# Patient Record
(demographics unavailable — no encounter records)

---

## 2017-12-07 NOTE — NUR
12/07/17 0439 Ivette Eldridge
0424 - PT ARRIVED TO PACU. MAINTAINING OWN AIRWAY. BP TAKEN WITH
MANUAL BP CUFF FOR ALL PRESSURES R/T INACURATE READINGS WITH MONITOR.

## 2017-12-08 NOTE — PR
[facility]
                                    [address]
                                  [place], [place]  [address]
_________________________________________________________________________________________
                                                                 Signed   
 
 
===================================
PP Progress Notes
===================================
Datetime Report Generated by CPN: 2017 10:09
   
SUBJECTIVE:  [id number]
Pain:  Within normal limits
Pain Comments:  has scratchy throat
Nausea/Vomiting:  Denies
Vital Signs:  [id number]
Vital Signs:  Reviewed; Within Normal Limits
POSTPARTUM EXAM:  [id number]
Cardiovascular:  Normal
Respiratory:  Normal
Abdomen/Uterus:  Abnormal
Lochia:  Normal
Vulva/Perineum:  Not Done
Breasts:  Not Done
CVA Tenderness:  Not Done
Extremities:  Normal
 Incision:  Normal
Breastfeeding Progress:  Not Applicable
Exam Comments:  Fundus firm, NT @ U-1.
      
   H/H 8.7/24.8, WBC 15.4, plat 273k
IMPRESSION/PLAN/PROCEDURES:  [id number]
Impression:  Normal postpartum progression
Other Impression:  scratchy throat
Other Plans:  ambulate, shower
Procedures:  None
Progress Notes:  Doing well though developing URI.  Will order throat lozenges for her
use.
Signing Physician:  Tiffany Pagan MD
 
 
CC:                                                              
 
 
 
 
 
 
 
*Electronically Signed*  17  1009  TIFFANY PAGAN MD            
                                                                       
_________________________________________________________________________________________
PATIENT NAME:     DEREK JACOBSON RECORD #: [id number]                     PROGRESS NOTE                 
          ACCT #: [id number]  
DATE OF BIRTH:   95                                       
PHYSICIAN:   TIFFANY PAGAN MD                   RPT #: [id number]
REPORT IS CONFIDENTIAL AND NOT TO BE RELEASED WITHOUT AUTHORIZATION

## 2017-12-09 NOTE — PR
[facility]
                                    [address]
                                  [place], [place]  [address]
_________________________________________________________________________________________
                                                                 Signed   
 
 
===================================
PP Progress Notes
===================================
Datetime Report Generated by CPN: 2017 10:02
   
SUBJECTIVE:  [id number]
Pain:  Within normal limits
Pain Comments:  Nausea last pm after Percocet
Nausea/Vomiting:  Denies
Flatus:  Yes
Vital Signs:  [id number]
Vital Signs:  Reviewed
Notable Details:  intermittent HTN
POSTPARTUM EXAM:  [id number]
Cardiovascular:  Normal
Respiratory:  Normal
Abdomen/Uterus:  Abnormal
Lochia:  Normal
Vulva/Perineum:  Not Done
Breasts:  Not Done
CVA Tenderness:  Not Done
Extremities:  Normal
 Incision:  Normal
Breastfeeding Progress:  Not Applicable
Exam Comments:  Abdomen with active BS.  Fundus firm, NT @ U-1.
IMPRESSION/PLAN/PROCEDURES:  [id number]
Impression:  Normal postpartum progression
Other Impression:  scratchy throat
Plan:  Remove staples; Discharge
Other Plans:  ambulate, shower
Procedures:  None
Other Procedures:  varicella vaccination
Progress Notes:  Doing well.  She is ready for D/C.
Signing Physician:  Tiffany Pagan MD
 
 
CC:                                                              
 
 
 
 
 
 
*Electronically Signed*  17  1002  TIFFANY PAGAN MD            
                                                                       
_________________________________________________________________________________________
PATIENT NAME:     DEREK JACOBSON                    
MEDICAL RECORD #: [id number]                     PROGRESS NOTE                 
          ACCT #: [id number]  
DATE OF BIRTH:   95                                       
PHYSICIAN:   TIFFANY PAGAN MD                   RPT #: [id number]
REPORT IS CONFIDENTIAL AND NOT TO BE RELEASED WITHOUT AUTHORIZATION

## 2018-01-08 NOTE — OR
[facility]
                                    [address]
                                  [place], [place]  [address]
_________________________________________________________________________________________
                                                                 Signed   
 
 
DATE OF OPERATION:
2017
 
SURGEON:
Nguyễn Negron DO
 
PREOPERATIVE DIAGNOSES:
1. IUP at 39 and 3 weeks' gestation.
2. Non-reassuring fetal heart tracing.
3. GBS positive, status post penicillin.
4. Depression.
5. Varicella nonimmune.
6. Persistent ROP position.
 
POSTOPERATIVE DIAGNOSES:
1. IUP at 39 and 3 weeks' gestation.
2. Non-reassuring fetal heart tracing.
3. GBS positive, status post penicillin.
4. Depression.
5. Varicella nonimmune.
6. Persistent ROP position.
 
PROCEDURE PERFORMED:
Primary low transverse  section.
 
ANESTHESIA:
Spinal.
 
ASSISTANT:
Nathaniel Madrid MD
 
ESTIMATED BLOOD LOSS:
700 mL.
 
FINDINGS:
Viable male  born in the ROP position.  8 pounds 7 ounces.  Apgars 8 and 9 at 1 
and 5 minutes respectively.  Normal uterus, tubes, and ovaries.  No nuchal cord noted.  
Normal placenta with centrally
inserted three vessel cord that was implanted posteriorly and
fundally.  Cord gases show arterial pH of 7.02, pCO2 of 72.6, HCO3 of 17.9, base excess 
of -14.8.  Venous pH is 7.09, pCO2 of 58.3, HCO3 of 16.9, and base excess of -13.6.
 
 
    Electronically Signed By: NGUYỄN NEGRON DO  18 1028
_________________________________________________________________________________________
PATIENT NAME:     INDIRADEREK                     
MEDICAL RECORD #: [id number]                     OPERATIVE REPORT              
          ACCT #: [id number]  
DATE OF BIRTH:   95                                       
PHYSICIAN:   NGUYỄN NEGRON DO                    REPORT #: [id number]
REPORT IS CONFIDENTIAL AND NOT TO BE RELEASED WITHOUT AUTHORIZATION
 
 
                                  [facility]
                                    [address] [place], [place]  [address]
_________________________________________________________________________________________
                                                                 Signed   
 
 
COMPLICATIONS:
None.
 
INDICATIONS:
Ms. Prescott is a pleasant 22-year-old, G2, P1,  female, who presented to Labor 
and Delivery complaining of contractions increasing in frequency and intensity.  
Pregnancy complicated by GBS positive status, depression, and varicella nonimmune status.
 Upon admission, the patient was found to be 6 cm dilated, and quickly progressed to 9 
cm.  She is GBS positive, was given penicillin x1 dose per protocol.  The patient was 
very uncomfortable with contractions, requested an epidural and anesthesia did present, 
however, the patient was felt that she would deliver spontaneously eminently.  The 
patient then developed a variable deceleration and fetal scalp electrode was then placed.
 With the next contraction, the patient developed another variable deceleration that 
remained bradycardic.  IV fluid bolus was administered.  O2 was applied, positioning was 
adjusted and terbutaline was then given.  The patient did attempt to push to deliver and 
the cervix was attempted to be reduced, however, this was unable to be easily 
accomplished and decision was made to proceed with primary low transverse  
section.  The patient was taken to the operating room, where in hands and knees position 
fetal heart rate was much improved.  Attempt for  was made with one or two pushes in 
the OR, however, the cervix was unable to be reduced and persistent ROP positioning was 
noted.  Risks, benefits, and alternatives of primary low transverse  section were
discussed with the patient, who verbally consents.
 
PROCEDURE IN DETAIL:
The patient was administered spinal anesthesia and then prepped and draped in the 
supine position with a bump under the right hip.  Ancef 2 g were given per SCIP protocol 
and no preoperative heparin was indicated.  ICPs were on running and a Delgado catheter was
inserted.  Vaginal prep was performed.  After ensuring that the spinal anesthetic was 
adequate, a Pfannenstiel skin incision was made 2 cm above the pubic symphysis and 
carried down to and through the fascia in the midline.  Fascial incision was extended 
bilaterally using curved Qiu scissors.  Fascia was grasped with Kocher clamps, elevated 
in the underlying rectus muscle, dissected off bluntly and sharply.  The peritoneum was 
entered bluntly and peritoneal incision was extended bilaterally using blunt and sharp 
dissection.  An Gene self retractor was placed and the lower uterine segment 
identified.  Hysterotomy was performed and clear fluid was noted.  The surgeon's hand was
placed into the uterine cavity.  After the hysterotomy was extended bilaterally using 
blunt dissection, persistent ROP positioning was noted with caput and the fetal head was 
elevated into the abdomen and delivered with the assistance of fundal pressure.  The 
remainder of the  delivered without difficulty.  Normal cord with no nuchal cord 
noted.  Upon delivery, the  was immediately vigorous and cried.  The oral 
nasopharynx were bulb suctioned.  The cord was doubly clamped and cut and handed to the 
waiting pediatric team for further care.  Cord gases were obtained and cord blood was 
 
    Electronically Signed By: NGUYỄN NEGRON DO  18 1028
_________________________________________________________________________________________
PATIENT NAME:     DEREK PRESCOTT                     
MEDICAL RECORD #: [id number]                     OPERATIVE REPORT              
          ACCT #: [id number]  
DATE OF BIRTH:   95                                       
PHYSICIAN:   NGUYỄN NEGRON DO                    REPORT #: [id number]
REPORT IS CONFIDENTIAL AND NOT TO BE RELEASED WITHOUT AUTHORIZATION
 
 
                                  [address]  [address]
_________________________________________________________________________________________
                                                                 Signed   
 
 
obtained for routine analysis.  The placenta was manually extracted and remaining 
products of conception and clot were removed using lap sponge.  Once the uterus was 
clear, the hysterotomy was repaired using 0 Monocryl in a running, locked stitch.  A 
second imbricating layer of 0 Monocryl was applied.  A small amount of oozing was noted 
in the midline.  This was made hemostatic with figure-of-eight sutures of 0 Monocryl.  
The pelvis was irrigated and found to be hemostatic.  Normal ovaries and fallopian tubes 
were noted.  The Gene was removed and the pelvis re-examined again and found to be 
hemostatic.  ACell sheet was applied to the lower uterine segment and peritoneum was 
reapproximated using 2-0 Vicryl in a running, nonlocked manner.  Rectus muscles were 
examined and found to be hemostatic and reapproximated with 0 Vicryl in 3 interrupted 
sutures that were loosely tied.  The rectus was irrigated and again found to be 
hemostatic.  The fascia was then reapproximated using 0 Vicryl in a running, nonlocked 
manner after ACell powder was applied to the rectus.  The fascia was then reapproximated 
using 0 Vicryl in a running nonlocked manner and subcu was reapproximated using 3-0 
Vicryl in a running nonlocked manner.  Skin was then reapproximated using surgical skin 
staples with good hemostasis and cosmesis.  Bandage was applied and the uterus was 
created for scant amount of bleeding.  Mother and baby are recovering well.  No 
complications. 
 
Sponge, needle and instrument count were correct x2 at the end of the procedure.  Dr. Madrid was present and participated in all portions of procedure.
 
 
 
 
________________________________________
 
 
DO BOBBY Ansari/ZIYAD
Job #:  [id number]
DD:  2017 05:05:21
DT:  2017 09:42:38
 
 
 
 
 
 
 
    Electronically Signed By: NGUYỄN NEGRON DO  18 1028
_________________________________________________________________________________________
PATIENT NAME:     DEREK PRESCOTT                     
MEDICAL RECORD #: [id number]                     OPERATIVE REPORT              
          ACCT #: [id number]  
DATE OF BIRTH:   95                                       
PHYSICIAN:   NGUYỄN NEGRON DO                    REPORT #: [id number]
REPORT IS CONFIDENTIAL AND NOT TO BE RELEASED WITHOUT AUTHORIZATION

## 2019-10-03 NOTE — NUR
10/03/19 2336 Cassie Butler
1852 PT ARRIVED TO [facility] ROOM 103 AND IS AWAKE, DENIES PAIN AND NAUSEA.
PT UNABLE TO MOVE FEET OR LEGS AND SPINAL LEVEL T-4, PT DENIES SOB.
VSS. IV INFUSING LR WITH 30 PIT AND DRESSING/SITE WNL.
 
1905 BABY TO CHEST AND VSS. SPINAL EDUCAITON GIVEN.
 
1918 REPORT TO [facility] RN, VSS. PT DENIES NAUSEA AND PAIN.

## 2019-10-03 NOTE — OR
[facility]
                                    [address] [place], [place]  [address]
_________________________________________________________________________________________
                                                                 Draft    
 
 
DATE OF OPERATION:
10/03/2019
 
SURGEON:
Tiffayn Pagan MD
 
FIRST ASSISTANT:
Nguyễn Negron DO.
 
PREOPERATIVE DIAGNOSIS:
Term pregnancy, previous  section, active labor.
 
POSTOPERATIVE DIAGNOSIS:
Term pregnancy, previous  section, active labor, delivered.
 
PROCEDURE:
Repeat  section with low segment transverse uterine incision.
 
ANESTHESIA:
Spinal.
 
ESTIMATED BLOOD LOSS:
600 mL.
 
DRAINS:
Delgado catheter.
 
INDICATIONS AND FINDINGS:
The patient is a 24-year-old female,  3, para 3, admitted at 37 and 3/7th weeks
in active labor.  Her pregnancy has otherwise been complicated by depression.  Fetal
heart tones were category 1.  She was taken to the operating room, where she was
delivered of a little boy via lower segment transverse uterine incision from the ROP
position with Apgars of 8 and 9 on a weight of 6 pounds 15 ounces.  Clear fluid was
noted at delivery.  There were some adhesions of the bladder over the anterior uterine
wall, but otherwise the uterus, tubes, ovaries, and placenta appeared normal. 
 
DESCRIPTION OF PROCEDURE:
The patient was prepped and draped in the supine position.  A repeat Pfannenstiel skin
incision was made and the incision carried through the fascia and extended laterally.
The inferior and superior fascial flaps were then created.  Muscles were sharply divided
and the peritoneum opened and the incision extended bluntly.  The Gene retractor was
then placed.  The adhesions on the anterior uterine wall of the bladder were incised
 
                                                                                    
_________________________________________________________________________________________
PATIENT NAME:     DEREK JACOBSON                     
MEDICAL RECORD #: [id number]            OPERATIVE REPORT              
          ACCT #: [id number]  
DATE OF BIRTH:   95            REPORT #: [id number]      
PHYSICIAN:        TIFFANY PAGAN MD            
PCP:              UMA FUNES PA-C         
REPORT IS CONFIDENTIAL AND NOT TO BE RELEASED WITHOUT AUTHORIZATION
 
 
                                  [facility]
                                    [address] [place], [place]  [address]
_________________________________________________________________________________________
                                                                 Draft    
 
 
allowing the bladder flap to be much lower.  An incision was made at the upper aspect of
the peritoneal reflection.  The incision was extended bluntly.  Artificial rupture of
membranes was carried out and the baby delivered with the above findings and handed off
to the pediatric staff in attendance.  The placenta was removed manually and the uterus
was explored with a lap tape assuring no remaining fragments.  The edges of the incision
were identified and the uterus was closed in 2 layers using 0 Monocryl.  The first layer
was a running locking stitch and the second was a vertical imbricating stitch.
Additional figure-of-eight was required near the left angle for control of bleeding.
The abdomen was then copiously irrigated and inspected.  Bleeding points on the
peritoneal edges were controlled with cautery.  Following this, the retractor was
removed and the peritoneum identified.  Nasal graft was laid over the lower segment to
aid in healing.  The peritoneum was then closed with a running suture of 3-0 Vicryl.
The muscles were not brought back together as they were essentially midline at the
conclusion of this procedure.  Bleeding points were controlled with cautery.  This layer
was irrigated and inspected.  Good hemostasis was noted.  ACell powder was sprinkled
over the muscles to aid in healing.  The fascia was then closed from each angle to the
midline with a running suture of 0 Vicryl.  The subcutaneous tissue was irrigated and
bleeding points controlled with cautery.  The subcu tissue was reapproximated with
interrupted sutures of 3-0 Vicryl.  The skin was closed with staples.  All sponge and
needle counts were correct.  She tolerated procedure well and was taken to the recovery
room in good condition. 
 
 
 
            ________________________________________
            Tiffany Pagan MD 
 
 
PJW/MODL
Job #:  [id number]
DD:  10/04/2019 08:37:43
DT:  10/04/2019 10:39:34
 
cc:            Nguyễn Negron DO
 
 
Copies:  NGUYỄN NEGRON DO
~
 
 
 
 
 
                                                                                    
_________________________________________________________________________________________
PATIENT NAME:     DEREK JACOBSON                     
MEDICAL RECORD #: [id number]            OPERATIVE REPORT              
          ACCT #: [id number]  
DATE OF BIRTH:   95            REPORT #: [id number]      
PHYSICIAN:        TIFFANY PAGAN MD            
PCP:              UMA FUNES PA-C         
REPORT IS CONFIDENTIAL AND NOT TO BE RELEASED WITHOUT AUTHORIZATION

## 2019-10-04 NOTE — PR
[facility] [place] [facility]
                                    [address]
                                  [place], [place]  [address]
_________________________________________________________________________________________
                                                                 Signed   
 
 
===================================
PP Progress Notes
===================================
Datetime Report Generated by CPN: 10/04/2019 08:17
   
SUBJECTIVE:  [id number]
Pain:  Within normal limits
Nausea/Vomiting:  Denies
Vital Signs:  [id number]
Vital Signs:  Reviewed; Within Normal Limits
POSTPARTUM EXAM:  [id number]
Cardiovascular:  Normal
Respiratory:  Normal
Abdomen/Uterus:  Abnormal
Lochia:  Normal
Vulva/Perineum:  Not Done
Breasts:  Not Done
CVA Tenderness:  Not Done
Extremities:  Normal
 Incision:  Normal
Breastfeeding Progress:  Not Applicable
Exam Comments:  Abdomen with active BS.  Fundus firm, NT @ U-2.
      
   H/H 8.4/26.3, WBC 11.7, plat 199k
IMPRESSION/PLAN/PROCEDURES:  [id number]
Impression:  Normal postpartum progression
Other Plans:  ambulate, D/C mireles and cap IV this afternoon
Progress Notes:  Doing well.  
Signing Physician:  Tiffany Pagan MD
 
 
Copies:                                
~
 
 
 
 
 
 
 
 
 
 
*Electronically Signed*  10/04/19  0817  TIFFANY PAGAN MD            
                                                                       
_________________________________________________________________________________________
PATIENT NAME:     DEREK JACOBSON                    
MEDICAL RECORD #: [id number]                     PROGRESS NOTE                 
          ACCT #: [id number]  
DATE OF BIRTH:   95                                       
PHYSICIAN:   TIFFANY PAGAN MD                   RPT #: [id number]
REPORT IS CONFIDENTIAL AND NOT TO BE RELEASED WITHOUT AUTHORIZATION

## 2019-10-05 NOTE — PR
[facility]
                                    [address]
                                  [place], [place]  [address]
_________________________________________________________________________________________
                                                                 Signed   
 
 
===================================
PP Progress Notes
===================================
Datetime Report Generated by CPN: 10/05/2019 09:30
   
SUBJECTIVE:  [id number]
Pain:  Within normal limits
Pain Comments:  vertigo improved
Nausea/Vomiting:  Denies
Flatus:  Yes
Vital Signs:  [id number]
Vital Signs:  Reviewed; Within Normal Limits
POSTPARTUM EXAM:  [id number]
Cardiovascular:  Normal
Respiratory:  Normal
Abdomen/Uterus:  Abnormal
Lochia:  Normal
Vulva/Perineum:  Not Done
Breasts:  Not Done
CVA Tenderness:  Not Done
Extremities:  Normal
 Incision:  Normal
Breastfeeding Progress:  Not Applicable
Exam Comments:  Abdomen with active BS.  Fundus firm, NT @ U-1.
IMPRESSION/PLAN/PROCEDURES:  [id number]
Impression:  Normal postpartum progression
Plan:  Discharge
Other Plans:  ambulate, D/C mireles and cap IV this afternoon
Procedures:  None
Progress Notes:  Doing well.  She is ready for D/C.
Signing Physician:  Tiffany Pagan MD
 
 
Copies:                                
~
 
 
 
 
 
 
 
 
*Electronically Signed*  10/05/19  09  TIFFANY PAGAN MD            
                                                                       
_________________________________________________________________________________________
PATIENT NAME:     INDIRADEREK                    
MEDICAL RECORD #: [id number]                     PROGRESS NOTE                 
          ACCT #: [id number]  
DATE OF BIRTH:   95                                       
PHYSICIAN:   TIFFANY PAGAN MD                   RPT #: [id number]
REPORT IS CONFIDENTIAL AND NOT TO BE RELEASED WITHOUT AUTHORIZATION